# Patient Record
(demographics unavailable — no encounter records)

---

## 2025-05-06 NOTE — ADDENDUM
[FreeTextEntry1] :  This note was written by Caron MCELROY, acting as a scribe for Dr. Jerel Awad.  I, Dr. Jerel Awad, have read and attest that all the information, medical decision-making, and discharge instructions within are true and accurate.  I, Dr. Jerel Awad, personally performed the evaluation and management (E/M) services for this established patient who presents today with (an) existing condition(s).  That E/M includes conducting the examination, assessing all conditions, and (re)establishing/reinforcing a plan of care.  Today, my ACP, Caron MCELROY, was here to observe my evaluation and management services for this condition to be followed going forward.

## 2025-05-06 NOTE — PROCEDURE
[FreeTextEntry1] : Abd duplex performed today to evaluate for renal stent patency and perfusion reveals patent L renal a. stent w/adequate parenchymal perfusion and unchanged velocities, renal in-stent flow stable from previous duplex, kidney measures 12.8x5.4cm, large renal cyst noted.

## 2025-05-06 NOTE — PHYSICAL EXAM
[Normal Thyroid] : the thyroid was normal [Normal Breath Sounds] : Normal breath sounds [Normal Heart Sounds] : normal heart sounds [Normal Rate and Rhythm] : normal rate and rhythm [2+] : left 2+ [No HSM] : no hepatosplenomegaly [No Rash or Lesion] : No rash or lesion [Alert] : alert [Calm] : calm [JVD] : no jugular venous distention  [Carotid Bruits] : no carotid bruits [Ankle Swelling (On Exam)] : not present [Varicose Veins Of Lower Extremities] : not present [] : not present [Abdomen Masses] : No abdominal masses [Abdomen Tenderness] : ~T ~M No abdominal tenderness [Abdominal Bruit] : no abdominal bruit  [de-identified] : Well-nourished, NAD [de-identified] : NC/AT, anicteric [de-identified] : SNTND [de-identified] : FROM throughout, strength 5/5x4

## 2025-05-06 NOTE — PHYSICAL EXAM
[Normal Thyroid] : the thyroid was normal [Normal Breath Sounds] : Normal breath sounds [Normal Heart Sounds] : normal heart sounds [Normal Rate and Rhythm] : normal rate and rhythm [2+] : left 2+ [No HSM] : no hepatosplenomegaly [No Rash or Lesion] : No rash or lesion [Alert] : alert [Calm] : calm [JVD] : no jugular venous distention  [Carotid Bruits] : no carotid bruits [Ankle Swelling (On Exam)] : not present [Varicose Veins Of Lower Extremities] : not present [] : not present [Abdomen Masses] : No abdominal masses [Abdomen Tenderness] : ~T ~M No abdominal tenderness [Abdominal Bruit] : no abdominal bruit  [de-identified] : Well-nourished, NAD [de-identified] : NC/AT, anicteric [de-identified] : SNTND [de-identified] : FROM throughout, strength 5/5x4

## 2025-05-06 NOTE — ASSESSMENT
[FreeTextEntry1] : 76yoM w/h/o HTN/HLD/AAA s/p EVAR s/p L renal artery PTA/stent and R atrophic kidney s/p previous hepatorenal bypass w/Dr. Huntley presents for f/u for surveillance.  No complaints at this time, feels well.  Normal abdominal exam noted today and Abd duplex performed today to evaluate for renal stent patency and perfusion reveals patent L renal a. stent w/adequate parenchymal perfusion and unchanged velocities, renal in-stent flow stable from previous duplex, kidney measures 12.8x5.4cm, large renal cyst noted.  No changes to care plan required at this time, recommend pt RTO in 1 year for surveillance of the L renal stent. [Arterial/Venous Disease] : arterial/venous disease

## 2025-05-06 NOTE — HISTORY OF PRESENT ILLNESS
[FreeTextEntry1] : 76yoM w/h/o HTN/HLD/AAA s/p EVAR s/p L renal artery PTA/stent and R atrophic kidney s/p previous hepatorenal bypass w/Dr. Huntley presents for f/u for surveillance. He is feeling well overall, denies any new issues. Normal eating/urinating habits, ambulates w/o difficulty.  No hospitalizations/med changes/surgeries since last visit in office.